# Patient Record
Sex: MALE | Race: WHITE
[De-identification: names, ages, dates, MRNs, and addresses within clinical notes are randomized per-mention and may not be internally consistent; named-entity substitution may affect disease eponyms.]

---

## 2020-01-01 ENCOUNTER — HOSPITAL ENCOUNTER (EMERGENCY)
Dept: HOSPITAL 39 - ER | Age: 25
Discharge: HOME | End: 2020-01-01
Payer: COMMERCIAL

## 2020-01-01 VITALS — SYSTOLIC BLOOD PRESSURE: 131 MMHG | DIASTOLIC BLOOD PRESSURE: 80 MMHG

## 2020-01-01 VITALS — OXYGEN SATURATION: 96 % | TEMPERATURE: 100.7 F

## 2020-01-01 DIAGNOSIS — Z87.891: ICD-10-CM

## 2020-01-01 DIAGNOSIS — J45.41: ICD-10-CM

## 2020-01-01 DIAGNOSIS — J20.8: Primary | ICD-10-CM

## 2020-01-01 PROCEDURE — 94640 AIRWAY INHALATION TREATMENT: CPT

## 2020-01-01 PROCEDURE — 87502 INFLUENZA DNA AMP PROBE: CPT

## 2020-01-01 RX ADMIN — IBUPROFEN ONE MG: 200 TABLET, FILM COATED ORAL at 16:53

## 2020-01-01 RX ADMIN — AZITHROMYCIN ONE MG: 250 TABLET, FILM COATED ORAL at 16:36

## 2020-01-01 RX ADMIN — IPRATROPIUM BROMIDE AND ALBUTEROL SULFATE ONE ML: .5; 3 SOLUTION RESPIRATORY (INHALATION) at 15:30

## 2020-01-01 NOTE — ED.PDOC
History of Present Illness





- General


Chief Complaint: Respiratory Problem


Stated Complaint: cough, fever


Time Seen by Provider: 01/01/20 15:24


Source: patient


Exam Limitations: no limitations





- History of Present Illness


Initial Comments: 





the patient is a 24-year-old  male presenting to the emergency room 

secondary to cough, shortness of breath and low-grade fever for the last 24-48 

hours.  Mild runny nose and mild sore throat.  He has been using his nebulizer. 

He does have a mild eczematous rash to his upper extremities.  No hypoxia.  No 

respiratory distress.


Timing/Duration: 24 hours


Severity: moderate


Improving Factors: nothing


Worsening Factors: nothing


Associated Symptoms: cough, fever/chills, malaise, shortness of breath


Allergies/Adverse Reactions: 


Allergies





NO KNOWN ALLERGY Allergy (Unverified 01/15/15 16:02)


   





Home Medications: 


Ambulatory Orders





Prednisone [Prednisone Intensol] 5 mg PO DAILY 01/15/15 


Albuterol Inhaler [Ventolin Hfa Inhaler] 108 mcg IN Q4-6H PRN #1 inh 01/19/15 


Clindamycin HCl 300 mg PO Q6-8H #21 cap 01/19/15 


Azithromycin 500 mg PO DAILY #5 tab 01/01/20 


predniSONE [Prednisone] 20 mg PO DAILY #5 tab 01/01/20 











Review of Systems





- Review of Systems


Constitutional: States: chills, fever, malaise


EENTM: States: nose congestion, throat pain


Respiratory: States: cough, short of breath


Cardiology: States: no symptoms reported


Gastrointestinal/Abdominal: States: no symptoms reported


Genitourinary: States: no symptoms reported


Musculoskeletal: States: no symptoms reported


Skin: States: no symptoms reported


Neurological: States: no symptoms reported


Endocrine: States: no symptoms reported





Past Medical History (General)





- Patient Medical History


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


Hx Congestive Heart Failure: No


Hx Pacemaker: No


Hx Hypertension: No


Hx Diabetes: No


Hx MRSA: No


Surgical History: other





- Vaccination History


Hx Influenza Vaccination: No





- Social History


Hx Tobacco Use: Yes





Family Medical History





- Family History


  ** Mother


Hx Family Diabetes: Yes





Physical Exam





- Physical Exam


General Appearance: Alert, No apparent distress, Ill Appearing


Eye Exam: bilateral normal


Ears, Nose, Throat: hearing grossly normal, nasal congestion, pharyngeal 

erythema


Neck: full range of motion, supple


Respiratory: no respiratory distress, no accessory muscle use, rhonchi, wheezing


Cardiovascular/Chest: normal peripheral pulses, regular rate, rhythm, no edema


Peripheral Pulses: radial,right: 2+, radial,left: 2+


Gastrointestinal/Abdominal: non tender, soft


Rectal Exam: deferred


Extremity: normal range of motion, normal inspection, no calf tenderness, normal

 capillary refill


Neurologic: CNs II-XII nml as tested, alert, normal mood/affect, oriented x 3


Skin Exam: other


Comments: 





mild eczematous rash to the forearms





                                   Vital Signs











  01/01/20





  15:15


 


Temperature 100.4 F H


 


Pulse Rate [ 110 H





left brachial] 


 


Respiratory 24





Rate 


 


Blood Pressure 131/80





[left brachial] 


 


O2 Sat by Pulse 94 L





Oximetry 














Progress





- Progress


Progress: 





01/01/20 16:27


the patient is a 24-year-old  male presenting with what is most likely 

a viral respiratory tract infection at this time triggering his asthma.  The 

patient has had a history of recurrent pneumonias and thus will be placed on 

azithromycin primarily prophylactically at this point.  He needs to continue his

breathing treatments.  He will also be written for 5 days oral prednisone to 

help with the asthma.  ER warnings were given for any worsening.  Tested 

negative for flu here today.





selvin robles [phone]





- Results/Orders


Results/Orders: 





rapid flu is negative.





Departure





- Departure


Clinical Impression: 


 Viral bronchitis





Asthma exacerbation


Qualifiers:


 Asthma severity: moderate Asthma persistence: persistent Qualified Code(s): 

J45.41 - Moderate persistent asthma with (acute) exacerbation





Disposition: Discharge to Home or Self Care


Condition: Fair


Departure Forms:  ED Discharge - Pt. Copy, Patient Portal Self Enrollment


Instructions:  DI for Asthma -- Adult


Diet: regular diet


Activity: increase activity as tolerated


Referrals: 


Monique Hawkins NP [Primary Care Provider] - 1-2 Weeks


Prescriptions: 


Azithromycin 500 mg PO DAILY #5 tab


predniSONE [Prednisone] 20 mg PO DAILY #5 tab


Home Medications: 


Ambulatory Orders





Prednisone [Prednisone Intensol] 5 mg PO DAILY 01/15/15 


Albuterol Inhaler [Ventolin Hfa Inhaler] 108 mcg IN Q4-6H PRN #1 inh 01/19/15 


Clindamycin HCl 300 mg PO Q6-8H #21 cap 01/19/15 


Azithromycin 500 mg PO DAILY #5 tab 01/01/20 


predniSONE [Prednisone] 20 mg PO DAILY #5 tab 01/01/20 








Additional Instructions: 


the patient is a 24-year-old  male presenting with what is most likely 

a viral respiratory tract infection at this time triggering his asthma.  The 

patient has had a history of recurrent pneumonias and thus will be placed on 

azithromycin primarily prophylactically at this point.  He needs to continue his

 breathing treatments.  He will also be written for 5 days oral prednisone to 

help with the asthma.  ER warnings were given for any worsening.  Tested 

negative for flu here today.